# Patient Record
Sex: FEMALE | Race: WHITE | NOT HISPANIC OR LATINO | Employment: UNEMPLOYED | URBAN - METROPOLITAN AREA
[De-identification: names, ages, dates, MRNs, and addresses within clinical notes are randomized per-mention and may not be internally consistent; named-entity substitution may affect disease eponyms.]

---

## 2018-02-18 RX ORDER — ACETAMINOPHEN AND CODEINE PHOSPHATE 120; 12 MG/5ML; MG/5ML
SOLUTION ORAL
Qty: 28 TABLET | Refills: 2 | Status: SHIPPED | OUTPATIENT
Start: 2018-02-18 | End: 2019-03-28

## 2018-03-22 ENCOUNTER — OFFICE VISIT (OUTPATIENT)
Dept: OBSTETRICS AND GYNECOLOGY | Facility: CLINIC | Age: 33
End: 2018-03-22

## 2018-03-22 ENCOUNTER — TELEPHONE (OUTPATIENT)
Dept: OBSTETRICS AND GYNECOLOGY | Facility: CLINIC | Age: 33
End: 2018-03-22

## 2018-03-22 VITALS — DIASTOLIC BLOOD PRESSURE: 70 MMHG | SYSTOLIC BLOOD PRESSURE: 128 MMHG | WEIGHT: 158 LBS

## 2018-03-22 DIAGNOSIS — Z01.411 ENCOUNTER FOR GYNECOLOGICAL EXAMINATION WITH ABNORMAL FINDING: Primary | ICD-10-CM

## 2018-03-22 DIAGNOSIS — Z13.9 SCREENING FOR CONDITION: ICD-10-CM

## 2018-03-22 DIAGNOSIS — N94.6 DYSMENORRHEA: ICD-10-CM

## 2018-03-22 DIAGNOSIS — N92.0 MENORRHAGIA WITH REGULAR CYCLE: ICD-10-CM

## 2018-03-22 PROBLEM — G40.909 SEIZURE DISORDER (HCC): Status: ACTIVE | Noted: 2018-03-22

## 2018-03-22 PROBLEM — Q85.1 TUBEROUS SCLEROSIS (HCC): Status: ACTIVE | Noted: 2018-03-22

## 2018-03-22 LAB
B-HCG UR QL: NEGATIVE
BILIRUB BLD-MCNC: NEGATIVE MG/DL
CLARITY, POC: CLEAR
COLOR UR: YELLOW
GLUCOSE UR STRIP-MCNC: NEGATIVE MG/DL
INTERNAL NEGATIVE CONTROL: NEGATIVE
INTERNAL POSITIVE CONTROL: POSITIVE
KETONES UR QL: NEGATIVE
LEUKOCYTE EST, POC: NEGATIVE
Lab: NORMAL
NITRITE UR-MCNC: NEGATIVE MG/ML
PH UR: 6.5 [PH] (ref 5–8)
PROT UR STRIP-MCNC: NEGATIVE MG/DL
RBC # UR STRIP: ABNORMAL /UL
SP GR UR: 1.02 (ref 1–1.03)
UROBILINOGEN UR QL: NORMAL

## 2018-03-22 PROCEDURE — 81002 URINALYSIS NONAUTO W/O SCOPE: CPT | Performed by: OBSTETRICS & GYNECOLOGY

## 2018-03-22 PROCEDURE — 99395 PREV VISIT EST AGE 18-39: CPT | Performed by: OBSTETRICS & GYNECOLOGY

## 2018-03-22 PROCEDURE — 81025 URINE PREGNANCY TEST: CPT | Performed by: OBSTETRICS & GYNECOLOGY

## 2018-03-22 RX ORDER — TOPIRAMATE 100 MG/1
200 TABLET, FILM COATED ORAL 2 TIMES DAILY
COMMUNITY

## 2018-03-22 RX ORDER — LAMIVUDINE 100 MG/1
100 TABLET, FILM COATED ORAL 4 TIMES DAILY
COMMUNITY
End: 2019-04-30

## 2018-03-22 RX ORDER — LORAZEPAM 1 MG/1
1 TABLET ORAL EVERY 8 HOURS PRN
COMMUNITY

## 2018-03-22 RX ORDER — LACOSAMIDE 100 MG/1
200 TABLET ORAL EVERY 12 HOURS SCHEDULED
COMMUNITY

## 2018-03-22 RX ORDER — CLOBAZAM 10 MG/1
10 TABLET ORAL
COMMUNITY
End: 2019-03-28

## 2018-03-22 NOTE — PROGRESS NOTES
GYN Annual Exam     CC- Here for annual exam.     Concha Messer is a 32 y.o. female who presents for annual well woman exam. Periods are regular every 28-30 days, lasting 4 days. Dysmenorrhea:moderate, occurring throughout menses. Cyclic symptoms include none. No intermenstrual bleeding, spotting, or discharge.  Patient is sexually active  no . Patient is satisfied with her contraception unsure since she gets cramping and heavy bleeding when she gets her cycle. .     OB History     No data available          Current contraception: oral progesterone-only contraceptive  History of abnormal Pap smear: no  History of abnormal mammogram: no  Family history of uterine, colon or ovarian cancer: no  Family history of breast cancer: yes - maternal aunt    Health Maintenance   Topic Date Due   • TDAP/TD VACCINES (1 - Tdap) 09/14/2003   • INFLUENZA VACCINE  08/01/2017   • PAP SMEAR  02/18/2018       No past medical history on file.    No past surgical history on file.      Current Outpatient Prescriptions:   •  cloBAZam (ONFI) 10 MG tablet, Take 10 mg by mouth., Disp: , Rfl:   •  lacosamide (VIMPAT) 100 MG tablet tablet, Take 200 mg by mouth Every 12 (Twelve) Hours., Disp: , Rfl:   •  lamiVUDine (EPIVIR HBV) 100 MG tablet, Take 100 mg by mouth 4 (Four) Times a Day., Disp: , Rfl:   •  LORazepam (ATIVAN) 1 MG tablet, Take 1 mg by mouth Every 8 (Eight) Hours As Needed for Anxiety., Disp: , Rfl:   •  topiramate (TOPAMAX) 100 MG tablet, Take 200 mg by mouth 2 (Two) Times a Day., Disp: , Rfl:   •  norethindrone (MICRONOR) 0.35 MG tablet, TAKE ONE TABLET BY MOUTH DAILY, Disp: 28 tablet, Rfl: 2    No Known Allergies    Social History   Substance Use Topics   • Smoking status: Not on file   • Smokeless tobacco: Not on file   • Alcohol use Not on file       No family history on file.    Review of Systems   Genitourinary: Positive for menstrual problem and vaginal bleeding. Negative for pelvic pain and vaginal discharge.       /70    Wt 71.7 kg (158 lb)   LMP 03/22/2018   Breastfeeding? No     Physical Exam   Constitutional: She is oriented to person, place, and time. She appears well-developed and well-nourished.   HENT:   Mouth/Throat: Normal dentition. No dental caries.   Cardiovascular: Normal rate and regular rhythm.    Pulmonary/Chest: Effort normal and breath sounds normal. Right breast exhibits no inverted nipple, no mass, no nipple discharge, no skin change and no tenderness. Left breast exhibits no inverted nipple, no mass, no nipple discharge, no skin change and no tenderness.   Abdominal: Soft. She exhibits no distension and no mass. There is no tenderness.   Genitourinary: There is no rash, tenderness or lesion on the right labia. There is no rash, tenderness or lesion on the left labia. Uterus is not deviated, not enlarged, not fixed and not tender. Cervix exhibits no motion tenderness, no discharge and no friability. Right adnexum displays no mass, no tenderness and no fullness. Left adnexum displays no mass, no tenderness and no fullness. There is bleeding in the vagina. No tenderness in the vagina. No vaginal discharge found.   Genitourinary Comments: Exam limited by pt guarding. Hymen intact.   Neurological: She is alert and oriented to person, place, and time.   Psychiatric: She has a normal mood and affect. Her behavior is normal. Judgment and thought content normal.   Vitals reviewed.         Assessment/Plan    1) GYN HM: LPS 3/2017 normal w negative HR HPV.   SBE demonstrated and encouraged.  2) STD screening: never had intercourse  3) Contraception: Micronor  4) Tuberous sclerosis/seizure disorder: Neurologist is Dr Jensen at [facility].  Not a candidate for combined OCPs  5) Diet and Exercise discussed  6) Smoking Status: nonsmoker  7) Menorrhagia/Dysmenorrhea: Pt can only take progesterone products. Discussed nexplanon to control better than Micronor. Pt not a good candidate for Mirena IUD- would need to be placed in  OR. Will order nexplanon for pt.  8) Follow up prn and 1 year       Diagnoses and all orders for this visit:    Encounter for gynecological examination with abnormal finding    Screening for condition  -     POC Urinalysis Dipstick  -     POC Pregnancy, Urine    Dysmenorrhea    Menorrhagia with regular cycle    Other orders  -     cloBAZam (ONFI) 10 MG tablet; Take 10 mg by mouth.  -     lamiVUDine (EPIVIR HBV) 100 MG tablet; Take 100 mg by mouth 4 (Four) Times a Day.  -     lacosamide (VIMPAT) 100 MG tablet tablet; Take 200 mg by mouth Every 12 (Twelve) Hours.  -     topiramate (TOPAMAX) 100 MG tablet; Take 200 mg by mouth 2 (Two) Times a Day.  -     LORazepam (ATIVAN) 1 MG tablet; Take 1 mg by mouth Every 8 (Eight) Hours As Needed for Anxiety.          Portia Andres DO  3/22/2018  10:50 AM

## 2018-04-19 ENCOUNTER — PROCEDURE VISIT (OUTPATIENT)
Dept: OBSTETRICS AND GYNECOLOGY | Facility: CLINIC | Age: 33
End: 2018-04-19

## 2018-04-19 VITALS
DIASTOLIC BLOOD PRESSURE: 80 MMHG | BODY MASS INDEX: 24.96 KG/M2 | WEIGHT: 159 LBS | HEIGHT: 67 IN | SYSTOLIC BLOOD PRESSURE: 124 MMHG

## 2018-04-19 DIAGNOSIS — Z30.9 ENCOUNTER FOR CONTRACEPTIVE MANAGEMENT, UNSPECIFIED TYPE: ICD-10-CM

## 2018-04-19 DIAGNOSIS — Z30.017 NEXPLANON INSERTION: Primary | ICD-10-CM

## 2018-04-19 LAB
B-HCG UR QL: NEGATIVE
INTERNAL NEGATIVE CONTROL: NEGATIVE
INTERNAL POSITIVE CONTROL: POSITIVE
Lab: NORMAL

## 2018-04-19 PROCEDURE — 11981 INSERTION DRUG DLVR IMPLANT: CPT | Performed by: OBSTETRICS & GYNECOLOGY

## 2018-04-19 PROCEDURE — 81025 URINE PREGNANCY TEST: CPT | Performed by: OBSTETRICS & GYNECOLOGY

## 2018-04-19 NOTE — PROGRESS NOTES
Nexplanon Insertion:    Chief Complaint: Nexplanon Insertion    Procedures      Pre Dx: 1) Nexplanon Insertion   Post Dx: 1) Nexplanon Insertion     Risks, benefits, alternatives explained. All questions answered. Consents signed.  The area for insertion prepped with betadine in a sterile fashion. About 3 mL of 1% lidocaine.     The insertion site was identified approximately 6-8 cm proximal to the elbow crease in the underside of the upper arm overlying the groove between the biceps and triceps muscles. The skin at the insertion site was stretched by my thumb and index finger. Then inserted the needle tip, bevel side up, at an angle not greater than 20° to the skin surface, just until the skin was penetrated. The applicator was then lowered so that it was parallel to the arm. To minimize the chance of deep incision, the skin was tented upwards with the tip of the needle. The needle was then gently inserted to the full length. Then fixed the device in place on the arm with one hand. I then slowly and carefully retracted the needle cannula back along the length of the device after pushing the release button. The obturator was seen in the device to determine that the nexplanon had been released.     Both the patient and I were easily able to feel the device under the skin. Steri-Strips were applied at the site, and the arm was gently wrapped with gauze. Pt instructed to keep bandage on for 12-24 hrs.    There were no complications.   The patient tolerated the procedure well.     She was given a reminder card. She was advised to use condoms as a backup method for at least a week after insertion.    Expectations, warning signs and limitations reviewed.     Portia Andres DO    4/19/2018  12:43 PM

## 2019-03-28 ENCOUNTER — OFFICE VISIT (OUTPATIENT)
Dept: OBSTETRICS AND GYNECOLOGY | Facility: CLINIC | Age: 34
End: 2019-03-28

## 2019-03-28 VITALS
DIASTOLIC BLOOD PRESSURE: 84 MMHG | SYSTOLIC BLOOD PRESSURE: 130 MMHG | WEIGHT: 167 LBS | HEIGHT: 67 IN | BODY MASS INDEX: 26.21 KG/M2

## 2019-03-28 DIAGNOSIS — N92.0 MENORRHAGIA WITH REGULAR CYCLE: ICD-10-CM

## 2019-03-28 DIAGNOSIS — N94.6 DYSMENORRHEA: ICD-10-CM

## 2019-03-28 DIAGNOSIS — Z01.419 WELL FEMALE EXAM WITH ROUTINE GYNECOLOGICAL EXAM: Primary | ICD-10-CM

## 2019-03-28 DIAGNOSIS — Z11.51 SPECIAL SCREENING EXAMINATION FOR HUMAN PAPILLOMAVIRUS (HPV): ICD-10-CM

## 2019-03-28 PROCEDURE — 99395 PREV VISIT EST AGE 18-39: CPT | Performed by: OBSTETRICS & GYNECOLOGY

## 2019-03-28 NOTE — PROGRESS NOTES
GYN Annual Exam     CC- Here for annual exam.     Concha Messer is a 33 y.o. female who presents for annual well woman exam. Pt has a Nexplanon in place to control dysmenorrhea and menstrual cycle control. Pt has never had sex. Pt is not happy with device and reports she is still having pain and having a lot of irregular bleeding. Pt is interested in something that will stop her cycles for good. Pt is not a good candidate for estrogen products due to her seizure disorder.    OB History     No data available          Current contraception: Nexplanon  History of abnormal Pap smear: no  History of abnormal mammogram: no  Family history of uterine, colon or ovarian cancer: no  Family history of breast cancer: yes - maternal aunt      Health Maintenance   Topic Date Due   • ANNUAL PHYSICAL  09/14/1987   • TDAP/TD VACCINES (1 - Tdap) 09/14/2003   • PAP SMEAR  02/18/2018   • INFLUENZA VACCINE  08/01/2018       History reviewed. No pertinent past medical history.    History reviewed. No pertinent surgical history.      Current Outpatient Medications:   •  lacosamide (VIMPAT) 100 MG tablet tablet, Take 200 mg by mouth Every 12 (Twelve) Hours., Disp: , Rfl:   •  lamiVUDine (EPIVIR HBV) 100 MG tablet, Take 100 mg by mouth 4 (Four) Times a Day., Disp: , Rfl:   •  LORazepam (ATIVAN) 1 MG tablet, Take 1 mg by mouth Every 8 (Eight) Hours As Needed for Anxiety., Disp: , Rfl:   •  topiramate (TOPAMAX) 100 MG tablet, Take 200 mg by mouth 2 (Two) Times a Day., Disp: , Rfl:     No Known Allergies    Social History     Tobacco Use   • Smoking status: Not on file   Substance Use Topics   • Alcohol use: Not on file   • Drug use: Not on file       History reviewed. No pertinent family history.    Review of Systems   Constitutional: Negative for unexpected weight change.   Gastrointestinal: Negative for abdominal distention, abdominal pain, anal bleeding, blood in stool, constipation and diarrhea.   Genitourinary: Positive for menstrual  "problem. Negative for pelvic pain, vaginal bleeding, vaginal discharge and vaginal pain.       /84   Ht 170.2 cm (67.01\")   Wt 75.8 kg (167 lb)   LMP 03/17/2019   BMI 26.15 kg/m²     Physical Exam   Constitutional: She is oriented to person, place, and time. She appears well-developed and well-nourished.   HENT:   Mouth/Throat: Normal dentition. No dental caries.   Cardiovascular: Normal rate and regular rhythm.   Pulmonary/Chest: Effort normal and breath sounds normal. Right breast exhibits no inverted nipple, no mass, no nipple discharge, no skin change and no tenderness. Left breast exhibits no inverted nipple, no mass, no nipple discharge, no skin change and no tenderness.   Abdominal: Soft. She exhibits no distension and no mass. There is no tenderness.   Genitourinary: There is no rash, tenderness or lesion on the right labia. There is no rash, tenderness or lesion on the left labia. Uterus is not deviated, not enlarged, not fixed and not tender. Cervix exhibits no motion tenderness, no discharge and no friability. Right adnexum displays no mass, no tenderness and no fullness. Left adnexum displays no mass, no tenderness and no fullness. No tenderness or bleeding in the vagina. No vaginal discharge found.   Genitourinary Comments: Hymen intact   Neurological: She is alert and oriented to person, place, and time.   Psychiatric: She has a normal mood and affect. Her behavior is normal. Judgment and thought content normal.   Vitals reviewed.         Assessment/Plan    1) GYN HM: LPS 3/2017 normal w negative HR HPV. Check pap smear. SBE demonstrated and encouraged. Pt has never had sex.  2) STD screening: never had intercourse  3) Contraception: nexplanon.pt considering permanent sterilization. Not interested in future children. G0.  4) Tuberous sclerosis/seizure disorder: Neurologist is Dr Jensen at [facility].  Not a candidate for combined OCPs  5) Diet and Exercise discussed  6) Smoking Status: " nonsmoker  7) Menorrhagia/Dysmenorrhea: Pt can only take progesterone products. The Nexplanon is not controlling her symptoms and she is interested in an endometrial ablation. Info given. FU in 2 weeks for TVUS and preop appt.  8) Follow up prn and 1 year           Diagnoses and all orders for this visit:    Well female exam with routine gynecological exam  -     POC Urinalysis Dipstick  -     POC Pregnancy, Urine  -     Pap IG, HPV-hr    Special screening examination for human papillomavirus (HPV)  -     Pap IG, HPV-hr          Portia Andres DO  3/28/2019  9:10 AM

## 2019-04-01 LAB
CYTOLOGIST CVX/VAG CYTO: NORMAL
CYTOLOGY CVX/VAG DOC THIN PREP: NORMAL
DX ICD CODE: NORMAL
HIV 1 & 2 AB SER-IMP: NORMAL
HPV I/H RISK 1 DNA CVX QL PROBE+SIG AMP: NEGATIVE
OTHER STN SPEC: NORMAL
PATH REPORT.FINAL DX SPEC: NORMAL
STAT OF ADQ CVX/VAG CYTO-IMP: NORMAL

## 2019-04-15 ENCOUNTER — PREP FOR SURGERY (OUTPATIENT)
Dept: OTHER | Facility: HOSPITAL | Age: 34
End: 2019-04-15

## 2019-04-15 ENCOUNTER — PROCEDURE VISIT (OUTPATIENT)
Dept: OBSTETRICS AND GYNECOLOGY | Facility: CLINIC | Age: 34
End: 2019-04-15

## 2019-04-15 ENCOUNTER — OFFICE VISIT (OUTPATIENT)
Dept: OBSTETRICS AND GYNECOLOGY | Facility: CLINIC | Age: 34
End: 2019-04-15

## 2019-04-15 VITALS
WEIGHT: 165.4 LBS | DIASTOLIC BLOOD PRESSURE: 92 MMHG | BODY MASS INDEX: 25.96 KG/M2 | SYSTOLIC BLOOD PRESSURE: 134 MMHG | HEIGHT: 67 IN

## 2019-04-15 DIAGNOSIS — N92.0 MENORRHAGIA WITH REGULAR CYCLE: Primary | ICD-10-CM

## 2019-04-15 DIAGNOSIS — G40.909 SEIZURE DISORDER (HCC): ICD-10-CM

## 2019-04-15 DIAGNOSIS — N92.0 MENORRHAGIA WITH REGULAR CYCLE: ICD-10-CM

## 2019-04-15 DIAGNOSIS — N94.6 DYSMENORRHEA: Primary | ICD-10-CM

## 2019-04-15 DIAGNOSIS — N94.6 DYSMENORRHEA: ICD-10-CM

## 2019-04-15 DIAGNOSIS — Q85.1 TUBEROUS SCLEROSIS (HCC): ICD-10-CM

## 2019-04-15 PROCEDURE — 76856 US EXAM PELVIC COMPLETE: CPT | Performed by: OBSTETRICS & GYNECOLOGY

## 2019-04-15 PROCEDURE — 99214 OFFICE O/P EST MOD 30 MIN: CPT | Performed by: OBSTETRICS & GYNECOLOGY

## 2019-04-15 RX ORDER — SODIUM CHLORIDE 0.9 % (FLUSH) 0.9 %
3 SYRINGE (ML) INJECTION EVERY 12 HOURS SCHEDULED
Status: CANCELLED | OUTPATIENT
Start: 2019-04-15

## 2019-04-15 RX ORDER — SODIUM CHLORIDE 0.9 % (FLUSH) 0.9 %
1-10 SYRINGE (ML) INJECTION AS NEEDED
Status: CANCELLED | OUTPATIENT
Start: 2019-04-15

## 2019-04-15 RX ORDER — SODIUM CHLORIDE 9 MG/ML
40 INJECTION, SOLUTION INTRAVENOUS AS NEEDED
Status: CANCELLED | OUTPATIENT
Start: 2019-04-15

## 2019-04-15 NOTE — PROGRESS NOTES
"PROBLEM VISIT    Chief Complaint: menorrhagia and dysmenorrhea      Concha Messer is a 33 y.o. patient who presents for follow up of menorrhagia and dysmenorrhea. Pt has a nexplanon in place to help control her menses but it is not helping. Pt is not a candidate for estrogen due to her seizure disorder. Pt has a low IQ but is capable of consenting to procedures performed on her. Pt and her mother have discussed it and have decided to proceed with a laparoscopic salpingectomy with endometrial ablation. Pt states that she does not think she should have children with her tuberous sclerosis.   Chief Complaint   Patient presents with   • Follow-up             The following portions of the patient's history were reviewed and updated as appropriate: allergies, current medications and problem list.    Review of Systems   Gastrointestinal: Negative for abdominal distention and abdominal pain.   Genitourinary: Positive for menstrual problem. Negative for pelvic pain, vaginal bleeding, vaginal discharge and vaginal pain.       /92   Ht 170.2 cm (67.01\")   Wt 75 kg (165 lb 6.4 oz)   LMP 03/11/2019 (Approximate)   Breastfeeding? No   BMI 25.90 kg/m²     Physical Exam   Constitutional: She is oriented to person, place, and time. She appears well-developed and well-nourished. No distress.   Cardiovascular: Normal rate, regular rhythm and normal heart sounds.   Pulmonary/Chest: Effort normal and breath sounds normal. No stridor. No respiratory distress. She has no wheezes.   Neurological: She is alert and oriented to person, place, and time. No cranial nerve deficit.   Skin: She is not diaphoretic.   Psychiatric: She has a normal mood and affect. Her behavior is normal. Judgment and thought content normal.   Vitals reviewed.        Assessment/Plan   Concha was seen today for follow-up.    Diagnoses and all orders for this visit:    Menorrhagia with regular cycle    Dysmenorrhea    Tuberous sclerosis (CMS/HCC)    Seizure " disorder (CMS/HCC)    34yo G0 with hx of menorrhagia and dysmenorrhea presents for a preop appt    1) Menorrhagia and dysmenorrhea: Pt has a Nexplanon in place to control bleeding and it has not helped. She has been on Micronor in the past as well. Pt's neurologist does not want her on estrogen due to her seizure disorder. Pt desires to proceed with an endometrial ablation. She is aware that it is not recommended to become pregnant after an endometrial ablation. Pt is virginal. Her TVUS done today is normal with no fibroids and normal ovaries. A preop EMBx was not performed bc pt cannot tolerate. Pt is low risk for hyperplasia or carcinoma. Will perform D and C at time of ablation. Procedure reviewed with pt at length including pre/intra/post procedure. All questions answered. Will schedule. Will remove Nexplanon under anesthesia.    2) Family planning: pt does not desire children. She has opted for a laparoscopic bilateral salpingectomy. Pt is aware that she will be unable to get pregnant after this procedure. Pt is virginal. Tubal papers were signed 2 weeks ago. Procedure reviewed with pt at length including pre/intra/post procedure. All questions answered. Will schedule.    3) Tuberous sclerosis and seizure disorder: pt is not a candidate for estrogen products per her neurologist.    4) Gyn HM: LPS 3/2019               No Follow-up on file.      Portia Andres DO    4/15/2019  10:34 PM

## 2019-04-23 ENCOUNTER — TELEPHONE (OUTPATIENT)
Dept: OBSTETRICS AND GYNECOLOGY | Facility: CLINIC | Age: 34
End: 2019-04-23

## 2019-04-30 RX ORDER — MULTIPLE VITAMINS W/ MINERALS TAB 9MG-400MCG
1 TAB ORAL DAILY
COMMUNITY

## 2019-04-30 RX ORDER — LAMOTRIGINE 100 MG/1
200 TABLET ORAL 2 TIMES DAILY
COMMUNITY

## 2019-05-01 ENCOUNTER — ANESTHESIA EVENT (OUTPATIENT)
Dept: PERIOP | Facility: HOSPITAL | Age: 34
End: 2019-05-01

## 2019-05-01 NOTE — ANESTHESIA PREPROCEDURE EVALUATION
Anesthesia Evaluation     Patient summary reviewed   history of anesthetic complications: PONV  NPO Solid Status: > 8 hours  NPO Liquid Status: > 8 hours           Airway   Mallampati: II  TM distance: >3 FB  Neck ROM: full  No difficulty expected  Dental    (+) edentulous    Pulmonary - negative pulmonary ROS and normal exam    breath sounds clear to auscultation  Cardiovascular - negative cardio ROS and normal exam    Rhythm: regular  Rate: normal        Neuro/Psych  Seizures: stable on meds, followed.  GI/Hepatic/Renal/Endo    (+)  GERD well controlled,      Musculoskeletal (-) negative ROS    Abdominal  - normal exam   Substance History - negative use     OB/GYN negative ob/gyn ROS         Other - negative ROS                 Has 2 seizures per month ... Getting better with meds. -- Continue Seizure meds as ordered.       Anesthesia Plan    ASA 2     general     intravenous induction   Anesthetic plan, all risks, benefits, and alternatives have been provided, discussed and informed consent has been obtained with: patient.  Use of blood products discussed with patient  Consented to blood products.

## 2019-05-08 ENCOUNTER — HOSPITAL ENCOUNTER (OUTPATIENT)
Facility: HOSPITAL | Age: 34
Setting detail: HOSPITAL OUTPATIENT SURGERY
Discharge: HOME | End: 2019-05-08
Attending: OBSTETRICS & GYNECOLOGY | Admitting: OBSTETRICS & GYNECOLOGY

## 2019-05-08 ENCOUNTER — ANESTHESIA (OUTPATIENT)
Dept: PERIOP | Facility: HOSPITAL | Age: 34
End: 2019-05-08

## 2019-05-08 VITALS
TEMPERATURE: 97.8 F | OXYGEN SATURATION: 100 % | RESPIRATION RATE: 14 BRPM | HEART RATE: 66 BPM | WEIGHT: 159.8 LBS | BODY MASS INDEX: 25.08 KG/M2 | SYSTOLIC BLOOD PRESSURE: 120 MMHG | DIASTOLIC BLOOD PRESSURE: 75 MMHG | HEIGHT: 67 IN

## 2019-05-08 DIAGNOSIS — N92.0 MENORRHAGIA WITH REGULAR CYCLE: ICD-10-CM

## 2019-05-08 LAB — HCG SERPL QL: NEGATIVE

## 2019-05-08 PROCEDURE — S0260 H&P FOR SURGERY: HCPCS | Performed by: OBSTETRICS & GYNECOLOGY

## 2019-05-08 PROCEDURE — 25010000002 KETOROLAC TROMETHAMINE PER 15 MG: Performed by: NURSE ANESTHETIST, CERTIFIED REGISTERED

## 2019-05-08 PROCEDURE — 25010000002 DEXAMETHASONE PER 1 MG: Performed by: ANESTHESIOLOGY

## 2019-05-08 PROCEDURE — 84703 CHORIONIC GONADOTROPIN ASSAY: CPT | Performed by: OBSTETRICS & GYNECOLOGY

## 2019-05-08 PROCEDURE — 58670 LAPAROSCOPY TUBAL CAUTERY: CPT | Performed by: OBSTETRICS & GYNECOLOGY

## 2019-05-08 PROCEDURE — 25010000002 FENTANYL CITRATE (PF) 100 MCG/2ML SOLUTION: Performed by: NURSE ANESTHETIST, CERTIFIED REGISTERED

## 2019-05-08 PROCEDURE — 88305 TISSUE EXAM BY PATHOLOGIST: CPT | Performed by: OBSTETRICS & GYNECOLOGY

## 2019-05-08 PROCEDURE — 25010000002 PROPOFOL 10 MG/ML EMULSION: Performed by: NURSE ANESTHETIST, CERTIFIED REGISTERED

## 2019-05-08 PROCEDURE — 25010000002 MIDAZOLAM PER 1 MG: Performed by: ANESTHESIOLOGY

## 2019-05-08 PROCEDURE — 58563 HYSTEROSCOPY ABLATION: CPT | Performed by: OBSTETRICS & GYNECOLOGY

## 2019-05-08 PROCEDURE — 11982 REMOVE DRUG IMPLANT DEVICE: CPT | Performed by: OBSTETRICS & GYNECOLOGY

## 2019-05-08 PROCEDURE — 25010000002 ONDANSETRON PER 1 MG: Performed by: ANESTHESIOLOGY

## 2019-05-08 PROCEDURE — 25010000002 NEOSTIGMINE PER 0.5 MG: Performed by: NURSE ANESTHETIST, CERTIFIED REGISTERED

## 2019-05-08 RX ORDER — MIDAZOLAM HYDROCHLORIDE 1 MG/ML
1 INJECTION INTRAMUSCULAR; INTRAVENOUS
Status: DISCONTINUED | OUTPATIENT
Start: 2019-05-08 | End: 2019-05-08 | Stop reason: HOSPADM

## 2019-05-08 RX ORDER — ROCURONIUM BROMIDE 10 MG/ML
INJECTION, SOLUTION INTRAVENOUS AS NEEDED
Status: DISCONTINUED | OUTPATIENT
Start: 2019-05-08 | End: 2019-05-08 | Stop reason: SURG

## 2019-05-08 RX ORDER — ONDANSETRON 2 MG/ML
4 INJECTION INTRAMUSCULAR; INTRAVENOUS ONCE AS NEEDED
Status: DISCONTINUED | OUTPATIENT
Start: 2019-05-08 | End: 2019-05-08 | Stop reason: HOSPADM

## 2019-05-08 RX ORDER — SODIUM CHLORIDE 9 MG/ML
INJECTION, SOLUTION INTRAVENOUS AS NEEDED
Status: DISCONTINUED | OUTPATIENT
Start: 2019-05-08 | End: 2019-05-08 | Stop reason: HOSPADM

## 2019-05-08 RX ORDER — FENTANYL CITRATE 50 UG/ML
INJECTION, SOLUTION INTRAMUSCULAR; INTRAVENOUS AS NEEDED
Status: DISCONTINUED | OUTPATIENT
Start: 2019-05-08 | End: 2019-05-08 | Stop reason: SURG

## 2019-05-08 RX ORDER — MAGNESIUM HYDROXIDE 1200 MG/15ML
LIQUID ORAL AS NEEDED
Status: DISCONTINUED | OUTPATIENT
Start: 2019-05-08 | End: 2019-05-08 | Stop reason: HOSPADM

## 2019-05-08 RX ORDER — SODIUM CHLORIDE 0.9 % (FLUSH) 0.9 %
1-10 SYRINGE (ML) INJECTION AS NEEDED
Status: DISCONTINUED | OUTPATIENT
Start: 2019-05-08 | End: 2019-05-08 | Stop reason: HOSPADM

## 2019-05-08 RX ORDER — KETOROLAC TROMETHAMINE 30 MG/ML
INJECTION, SOLUTION INTRAMUSCULAR; INTRAVENOUS AS NEEDED
Status: DISCONTINUED | OUTPATIENT
Start: 2019-05-08 | End: 2019-05-08 | Stop reason: SURG

## 2019-05-08 RX ORDER — LIDOCAINE HYDROCHLORIDE 20 MG/ML
INJECTION, SOLUTION INFILTRATION; PERINEURAL AS NEEDED
Status: DISCONTINUED | OUTPATIENT
Start: 2019-05-08 | End: 2019-05-08 | Stop reason: SURG

## 2019-05-08 RX ORDER — SODIUM CHLORIDE, SODIUM LACTATE, POTASSIUM CHLORIDE, CALCIUM CHLORIDE 600; 310; 30; 20 MG/100ML; MG/100ML; MG/100ML; MG/100ML
9 INJECTION, SOLUTION INTRAVENOUS CONTINUOUS
Status: DISCONTINUED | OUTPATIENT
Start: 2019-05-08 | End: 2019-05-08 | Stop reason: HOSPADM

## 2019-05-08 RX ORDER — FAMOTIDINE 20 MG/1
20 TABLET, FILM COATED ORAL
Status: COMPLETED | OUTPATIENT
Start: 2019-05-08 | End: 2019-05-08

## 2019-05-08 RX ORDER — SODIUM CHLORIDE, SODIUM LACTATE, POTASSIUM CHLORIDE, CALCIUM CHLORIDE 600; 310; 30; 20 MG/100ML; MG/100ML; MG/100ML; MG/100ML
100 INJECTION, SOLUTION INTRAVENOUS CONTINUOUS
Status: DISCONTINUED | OUTPATIENT
Start: 2019-05-08 | End: 2019-05-08 | Stop reason: HOSPADM

## 2019-05-08 RX ORDER — ONDANSETRON 2 MG/ML
4 INJECTION INTRAMUSCULAR; INTRAVENOUS ONCE AS NEEDED
Status: COMPLETED | OUTPATIENT
Start: 2019-05-08 | End: 2019-05-08

## 2019-05-08 RX ORDER — SODIUM CHLORIDE 9 MG/ML
40 INJECTION, SOLUTION INTRAVENOUS AS NEEDED
Status: DISCONTINUED | OUTPATIENT
Start: 2019-05-08 | End: 2019-05-08 | Stop reason: HOSPADM

## 2019-05-08 RX ORDER — LIDOCAINE HYDROCHLORIDE 10 MG/ML
INJECTION, SOLUTION INFILTRATION; PERINEURAL AS NEEDED
Status: DISCONTINUED | OUTPATIENT
Start: 2019-05-08 | End: 2019-05-08 | Stop reason: HOSPADM

## 2019-05-08 RX ORDER — FENTANYL CITRATE 50 UG/ML
50 INJECTION, SOLUTION INTRAMUSCULAR; INTRAVENOUS
Status: DISCONTINUED | OUTPATIENT
Start: 2019-05-08 | End: 2019-05-08 | Stop reason: HOSPADM

## 2019-05-08 RX ORDER — MIDAZOLAM HYDROCHLORIDE 1 MG/ML
2 INJECTION INTRAMUSCULAR; INTRAVENOUS
Status: DISCONTINUED | OUTPATIENT
Start: 2019-05-08 | End: 2019-05-08 | Stop reason: HOSPADM

## 2019-05-08 RX ORDER — OXYCODONE HYDROCHLORIDE AND ACETAMINOPHEN 5; 325 MG/1; MG/1
1 TABLET ORAL EVERY 4 HOURS PRN
Qty: 30 TABLET | Refills: 0 | Status: SHIPPED | OUTPATIENT
Start: 2019-05-08

## 2019-05-08 RX ORDER — SODIUM CHLORIDE 0.9 % (FLUSH) 0.9 %
3 SYRINGE (ML) INJECTION EVERY 12 HOURS SCHEDULED
Status: DISCONTINUED | OUTPATIENT
Start: 2019-05-08 | End: 2019-05-08 | Stop reason: HOSPADM

## 2019-05-08 RX ORDER — GLYCOPYRROLATE 0.2 MG/ML
INJECTION INTRAMUSCULAR; INTRAVENOUS AS NEEDED
Status: DISCONTINUED | OUTPATIENT
Start: 2019-05-08 | End: 2019-05-08 | Stop reason: SURG

## 2019-05-08 RX ORDER — LIDOCAINE HYDROCHLORIDE 10 MG/ML
0.5 INJECTION, SOLUTION EPIDURAL; INFILTRATION; INTRACAUDAL; PERINEURAL ONCE AS NEEDED
Status: COMPLETED | OUTPATIENT
Start: 2019-05-08 | End: 2019-05-08

## 2019-05-08 RX ORDER — IBUPROFEN 800 MG/1
800 TABLET ORAL EVERY 8 HOURS PRN
Qty: 30 TABLET | Refills: 0 | Status: SHIPPED | OUTPATIENT
Start: 2019-05-08 | End: 2019-06-07

## 2019-05-08 RX ORDER — DEXAMETHASONE SODIUM PHOSPHATE 4 MG/ML
8 INJECTION, SOLUTION INTRA-ARTICULAR; INTRALESIONAL; INTRAMUSCULAR; INTRAVENOUS; SOFT TISSUE ONCE
Status: COMPLETED | OUTPATIENT
Start: 2019-05-08 | End: 2019-05-08

## 2019-05-08 RX ORDER — PROPOFOL 10 MG/ML
VIAL (ML) INTRAVENOUS AS NEEDED
Status: DISCONTINUED | OUTPATIENT
Start: 2019-05-08 | End: 2019-05-08 | Stop reason: SURG

## 2019-05-08 RX ADMIN — ONDANSETRON 4 MG: 2 INJECTION, SOLUTION INTRAMUSCULAR; INTRAVENOUS at 09:07

## 2019-05-08 RX ADMIN — MIDAZOLAM HYDROCHLORIDE 1 MG: 1 INJECTION, SOLUTION INTRAMUSCULAR; INTRAVENOUS at 09:46

## 2019-05-08 RX ADMIN — LIDOCAINE HYDROCHLORIDE 0.2 ML: 10 INJECTION, SOLUTION EPIDURAL; INFILTRATION; INTRACAUDAL; PERINEURAL at 08:45

## 2019-05-08 RX ADMIN — FENTANYL CITRATE 100 MCG: 50 INJECTION, SOLUTION INTRAMUSCULAR; INTRAVENOUS at 09:53

## 2019-05-08 RX ADMIN — SODIUM CHLORIDE, POTASSIUM CHLORIDE, SODIUM LACTATE AND CALCIUM CHLORIDE 9 ML/HR: 600; 310; 30; 20 INJECTION, SOLUTION INTRAVENOUS at 08:45

## 2019-05-08 RX ADMIN — DEXAMETHASONE SODIUM PHOSPHATE 8 MG: 4 INJECTION, SOLUTION INTRAMUSCULAR; INTRAVENOUS at 09:06

## 2019-05-08 RX ADMIN — SODIUM CHLORIDE, POTASSIUM CHLORIDE, SODIUM LACTATE AND CALCIUM CHLORIDE 100 ML/HR: 600; 310; 30; 20 INJECTION, SOLUTION INTRAVENOUS at 11:49

## 2019-05-08 RX ADMIN — MIDAZOLAM HYDROCHLORIDE 1 MG: 1 INJECTION, SOLUTION INTRAMUSCULAR; INTRAVENOUS at 09:00

## 2019-05-08 RX ADMIN — LIDOCAINE HYDROCHLORIDE 100 MG: 20 INJECTION, SOLUTION INFILTRATION; PERINEURAL at 09:53

## 2019-05-08 RX ADMIN — GLYCOPYRROLATE 0.8 MG: 0.2 INJECTION INTRAMUSCULAR; INTRAVENOUS at 10:43

## 2019-05-08 RX ADMIN — PROPOFOL 200 MG: 10 INJECTION, EMULSION INTRAVENOUS at 09:55

## 2019-05-08 RX ADMIN — FAMOTIDINE 20 MG: 10 INJECTION, SOLUTION INTRAVENOUS at 09:06

## 2019-05-08 RX ADMIN — ROCURONIUM BROMIDE 50 MG: 10 INJECTION INTRAVENOUS at 09:55

## 2019-05-08 RX ADMIN — FENTANYL CITRATE 50 MCG: 50 INJECTION, SOLUTION INTRAMUSCULAR; INTRAVENOUS at 10:12

## 2019-05-08 RX ADMIN — NEOSTIGMINE METHYLSULFATE 5 MG: 1 INJECTION, SOLUTION INTRAMUSCULAR; INTRAVENOUS; SUBCUTANEOUS at 10:43

## 2019-05-08 RX ADMIN — KETOROLAC TROMETHAMINE 30 MG: 30 INJECTION INTRAMUSCULAR; INTRAVENOUS at 10:26

## 2019-05-08 NOTE — OP NOTE
Subjective     Date of Service:  05/08/19  Time of Service:  11:11 AM    Surgical Staff: Surgeon(s) and Role:     * Portia Andres DO - Primary   Additional Staff: Johana   Pre-operative diagnosis(es): Pre-Op Diagnosis Codes:     * Menorrhagia with regular cycle [N92.0]     Post-operative diagnosis(es): Post-Op Diagnosis Codes:     * Menorrhagia with regular cycle [N92.0]   Procedure(s): Procedure(s):  LAPAROSCOPIC BILATERAL COAGULATION OF FALLOPIAN TUBES  DILATATION AND CURETTAGE HYSTEROSCOPY NOVASURE ENDOMETRIAL ABLATION and removal of nexplanon in left upper arm     Antibiotics: none ordered on call to OR     Anesthesia: Type: General  ASA:  II     Objective      Operative findings: Normal pelvis with normal uterus, ovaries and fallopian tubes   Specimens removed: ID Type Source Tests Collected by Time   A :  Tissue Endometrial Curettings TISSUE PATHOLOGY EXAM Portia Andres DO 5/8/2019 1028      Fluid Intake: 300mL   Output: Documented Output  Est. Blood Loss 5mL      I/O this shift:  In: 300 [I.V.:300]  Out: -      Blood products used: No   Drains: [REMOVED] Urethral Catheter Straight-tip 16 Fr. (Removed)      Implant Information: Nothing was implanted during the procedure   Complications: None apparent   Intraoperative consult(s):    Condition: stable   Disposition: to PACU and then admit to  home         Assessment/Plan     32yo G0 with a hx of tuberous sclerosis and seizure disorder with a long standing history of menorrhagia and dysmenorrhea. Pt has a Nexplanon in place to control bleeding and it has not helped. She has been on Micronor in the past as well. Pt's neurologist does not want her on estrogen due to her seizure disorder. Pt desires to proceed with an endometrial ablation. She is aware that it is not recommended to become pregnant after an endometrial ablation. Pt is virginal. Her TVUS done today is normal with no fibroids and normal ovaries. A preop EMBx was not performed bc pt cannot  tolerate. Pt is low risk for hyperplasia or carcinoma. Will perform D and C at time of ablation. Pt does not desire children. She has opted for a laparoscopic bilateral salpingectomy at the time of endometrial ablation. Pt is aware that she will be unable to get pregnant after this procedure. Procedure reviewed with pt at length including pre/intra/post procedure. All questions answered. Plans to remove Nexplanon under anesthesia.    After the procedure have been again reviewed with patient and family and all questions were answered, the patient was taken to the operating room and placed under general anesthesia.  She was carefully placed in the dorsolithotomy position and prepped and draped in the normal sterile fashion.  A sponge stick was placed into the vagina for manipulation of the uterus.  A small infraumbilical incision was made and a 5 mm trocar was placed without difficulty.  The abdomen was insufflated with CO2 gas and the patient was placed in Trendelenburg position.  A 5 mm trochars placed in the left and the right lower quadrant.  A survey of the patient's pelvis revealed a normal uterus, normal ovaries, normal fallopian tubes.  The patient's left fallopian tube was grasped and followed out to the fimbriated end.  Bipolar coagulation was then used to destroy the isthmus portion of the fallopian tube.  In the same fashion the patient's right fallopian tube was followed out to the fimbriated end.  The isthmus portion was grasped and coagulated to sufficiently destroy it.  This portion of the procedure was over and all instrument removed from the patient's abdomen and the CO2 gas was released from the abdomen.  The skin incisions were reapproximated with 4-0 Vicryl in a sub-particular fashion.  Attention was then turned to the vaginal area.  A speculum was placed into the vagina for clear visualization of the cervix.  A single-tooth tenaculum was used to grasp the anterior lip of the cervix.  The cervical  loss was dilated.  The length of the endometrial cavity was measured at 5 cm.  The hysteroscope was inserted through the cervical canal and the endometrial cavity was distended with normal saline.  The uterus was noted to be retroverted.  Endometrial tissue was visualized in the endometrial cavity but it appeared to be normal and benign tissue.  The hysteroscope was then removed and a curette was placed into the endometrial cavity.  A sampling of the endometrial lining was made and sent to pathology.  The NovaSure system was then placed into the endometrial cavity and the cavity width was measured at 2.5 cm.  The system was tested and a full therapy cycle of 1 minute and 30 seconds was performed at a power of 69 W.  This portion of the procedure was then deemed over.  All instruments removed from the patient's vagina and hemostasis was noted at the tenaculum site.  Finally, attention was then turned to the patient's left upper extremity.  The Nexplanon russell was palpated in the left upper extremity.  The area was prepped.  Lidocaine was injected at the tip of the Nexplanon and.  A scalpel was then used to make an incision in the Nexplanon russell was brought through this incision and grasped with a hemostat and completely removed without difficulty.  Pressure dressing was then applied to help with any ecchymosis.  At this point all the procedures were deemed over.  Patient tolerated everything very well.  There were no apparent complications.  Patient is currently resting in postanesthesia recovery.  Patient will follow-up with me in approximately 2 weeks for continued postoperative care.

## 2019-05-08 NOTE — ANESTHESIA POSTPROCEDURE EVALUATION
Patient: Concha Messer    Procedure Summary     Date:  05/08/19 Room / Location:  [facility] OR 1 /  [facility] OR    Anesthesia Start:  0952 Anesthesia Stop:  1055    Procedures:       LAPAROSCOPIC BILATERAL COAGULATION OF FALLOPIAN TUBES (Bilateral Abdomen)      DILATATION AND CURETTAGE HYSTEROSCOPY NOVASURE ENDOMETRIAL ABLATION and removal of nexplanon in left upper arm (Left Vagina) Diagnosis:       Menorrhagia with regular cycle      (Menorrhagia with regular cycle [N92.0])    Surgeon:  Portia Andres DO Provider:  Africa Shell CRNA    Anesthesia Type:  general ASA Status:  2          Anesthesia Type: general  Last vitals  BP   132/94 (05/08/19 1144)   Temp   97.8 °F (36.6 °C) (05/08/19 1144)   Pulse   72 (05/08/19 1144)   Resp   14 (05/08/19 1144)     SpO2   100 % (05/08/19 1144)     Post Anesthesia Care and Evaluation    Patient location during evaluation: PHASE II  Patient participation: complete - patient participated  Level of consciousness: awake and alert  Pain score: 2  Pain management: satisfactory to patient  Airway patency: patent  Anesthetic complications: No anesthetic complications  PONV Status: none  Cardiovascular status: acceptable  Respiratory status: acceptable  Hydration status: acceptable

## 2019-05-08 NOTE — ANESTHESIA PROCEDURE NOTES
Airway  Urgency: elective    Date/Time: 5/8/2019 9:56 AM  End Time:5/8/2019 9:56 AM    General Information and Staff    Patient location during procedure: OR  CRNA: Africa Shell CRNA    Indications and Patient Condition  Indications for airway management: airway protection    Preoxygenated: yes  MILS maintained throughout  Mask difficulty assessment: 1 - vent by mask (easy)    Final Airway Details  Final airway type: endotracheal airway      Successful airway: ETT  Cuffed: yes   Successful intubation technique: direct laryngoscopy  Facilitating devices/methods: intubating stylet  Endotracheal tube insertion site: oral  Blade: Vasques  Blade size: 2  ETT size (mm): 7.0  Cormack-Lehane Classification: grade I - full view of glottis  Placement verified by: chest auscultation and capnometry   Measured from: lips  ETT to lips (cm): 20  Number of attempts at approach: 1    Additional Comments  BEBS, +ETCO2, VSS. GUMS AS PRE-OP.

## 2019-05-08 NOTE — H&P
Patient Care Team:  Provider, No Known as PCP - General    Chief complaint menorrhagia and dysmenorrhea       HPI: 34yo G0 with a hx of tuberous sclerosis and seizure disorder with a long standing history of menorrhagia and dysmenorrhea. Pt has a Nexplanon in place to control bleeding and it has not helped. She has been on Micronor in the past as well. Pt's neurologist does not want her on estrogen due to her seizure disorder. Pt desires to proceed with an endometrial ablation. She is aware that it is not recommended to become pregnant after an endometrial ablation. Pt is virginal. Her TVUS done today is normal with no fibroids and normal ovaries. A preop EMBx was not performed bc pt cannot tolerate. Pt is low risk for hyperplasia or carcinoma. Will perform D and C at time of ablation. Pt does not desire children. She has opted for a laparoscopic bilateral salpingectomy at the time of endometrial ablation. Pt is aware that she will be unable to get pregnant after this procedure. Procedure reviewed with pt at length including pre/intra/post procedure. All questions answered. Will remove Nexplanon under anesthesia.                Debilities: None    Emotional Behavior: Appropriate    PMH:   Past Medical History:   Diagnosis Date   • Acid reflux    • PONV (postoperative nausea and vomiting)    • Seizure (CMS/HCC)     last 10 days ago, follows w/Dr Zhu ()   • Tuberous sclerosis (CMS/HCC)          PSH:   Past Surgical History:   Procedure Laterality Date   • TEETH EXTRACTION         SoHx:   Social History     Socioeconomic History   • Marital status: Single     Spouse name: Not on file   • Number of children: Not on file   • Years of education: Not on file   • Highest education level: Not on file   Tobacco Use   • Smoking status: Never Smoker   • Smokeless tobacco: Never Used   Substance and Sexual Activity   • Alcohol use: No     Frequency: Never   • Drug use: No   • Sexual activity: Defer       FHx:   Family  History   Problem Relation Age of Onset   • Arthritis Mother    • COPD Maternal Grandmother    • Lung cancer Maternal Grandfather    • Malig Hyperthermia Neg Hx        PGyn Hx: LPS 3/2019    POBHx: G0    Allergies: Patient has no known allergies.    Medications:   No current facility-administered medications on file prior to encounter.      Current Outpatient Medications on File Prior to Encounter   Medication Sig Dispense Refill   • lacosamide (VIMPAT) 100 MG tablet tablet Take 200 mg by mouth Every 12 (Twelve) Hours.     • lamoTRIgine (LaMICtal) 100 MG tablet Take 200 mg by mouth 2 (Two) Times a Day.     • LORazepam (ATIVAN) 1 MG tablet Take 1 mg by mouth Every 8 (Eight) Hours As Needed for Anxiety.     • Multiple Vitamins-Minerals (MULTIVITAMIN WITH MINERALS) tablet tablet Take 1 tablet by mouth Daily.     • topiramate (TOPAMAX) 100 MG tablet Take 200 mg by mouth 2 (Two) Times a Day.               Vital Signs  Temp:  [98.3 °F (36.8 °C)] 98.3 °F (36.8 °C)  Heart Rate:  [82] 82  Resp:  [20] 20  BP: (127)/(86) 127/86    Physical Exam:  General: NAD  Heart:: RRR  Lungs: clear  Abdomen: soft, NT  Genitalia: deferred  Extremities: no calf tenderness  Psychological: AAOx3      Labs: HCG neg      Assessment/Plan: 34yo G0 with hx of menorrhagia/dysmenorrhea and no desire for future children. Proceed with hysteroscopy, D and C, endometrial ablation, laparoscopic bilateral coagulation of fallopian tubes, removal of nexplanon in upper extremity.        I discussed the patients findings and my recommendations with patient and family.     Portia Andres DO  05/08/19  9:29 AM

## 2019-05-09 LAB
CYTO UR: NORMAL
LAB AP CASE REPORT: NORMAL
PATH REPORT.FINAL DX SPEC: NORMAL
PATH REPORT.GROSS SPEC: NORMAL

## 2019-05-14 RX ORDER — IBUPROFEN 800 MG/1
TABLET ORAL
Qty: 30 TABLET | Refills: 0 | OUTPATIENT
Start: 2019-05-14

## (undated) DEVICE — LAG GYN LAPAROSCOPY: Brand: MEDLINE INDUSTRIES, INC.

## (undated) DEVICE — GLV SURG SENSICARE MICRO PF LF 6 STRL

## (undated) DEVICE — ENDOPATH XCEL UNIVERSAL TROCAR STABLILITY SLEEVES: Brand: ENDOPATH XCEL

## (undated) DEVICE — ENDOPATH XCEL BLADELESS TROCARS WITH STABILITY SLEEVES: Brand: ENDOPATH XCEL

## (undated) DEVICE — PROB ABL ENDOMTRL NOVASURE/G4 IMPEDENCE 1P/U

## (undated) DEVICE — DRSNG TELFA PAD NONADH STR 1S 3X8IN

## (undated) DEVICE — TBG INSUFL W FLTR STRL

## (undated) DEVICE — CYSTO/BLADDER IRRIGATION SET, REGULATING CLAMP

## (undated) DEVICE — BANDAGE,GAUZE,BULKEE II,4.5"X4.1YD,STRL: Brand: MEDLINE

## (undated) DEVICE — UNDYED BRAIDED (POLYGLACTIN 910), SYNTHETIC ABSORBABLE SUTURE: Brand: COATED VICRYL

## (undated) DEVICE — SPNG GZ 2S 2X2 8PLY STRL PK/2